# Patient Record
Sex: FEMALE | Race: WHITE
[De-identification: names, ages, dates, MRNs, and addresses within clinical notes are randomized per-mention and may not be internally consistent; named-entity substitution may affect disease eponyms.]

---

## 2018-06-22 ENCOUNTER — HOSPITAL ENCOUNTER (EMERGENCY)
Dept: HOSPITAL 25 - ED | Age: 68
Discharge: HOME | End: 2018-06-22
Payer: MEDICARE

## 2018-06-22 VITALS — SYSTOLIC BLOOD PRESSURE: 146 MMHG | DIASTOLIC BLOOD PRESSURE: 88 MMHG

## 2018-06-22 DIAGNOSIS — S00.31XA: ICD-10-CM
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
## 2018-06-22 DIAGNOSIS — S61.512A: Primary | ICD-10-CM

## 2018-06-22 DIAGNOSIS — Z23: ICD-10-CM

## 2018-06-22 PROCEDURE — 90471 IMMUNIZATION ADMIN: CPT

## 2018-06-22 PROCEDURE — 12002 RPR S/N/AX/GEN/TRNK2.6-7.5CM: CPT

## 2018-06-22 PROCEDURE — 99283 EMERGENCY DEPT VISIT LOW MDM: CPT

## 2018-06-22 PROCEDURE — 90715 TDAP VACCINE 7 YRS/> IM: CPT

## 2018-06-22 NOTE — ED
Laceration/Wound HPI





- HPI Summary


HPI Summary: 





Complains of mechanical fall today, hitting her nose on concrete, left wrist 

pain, laceration to left wrist.  Patient had nausea for around 20 minutes with 

mild nosebleed, but Denies LOC, HA, vision change, vomiting, change in mental 

status, trauma to tongue or lips teeth, neck pain, back pain, any other 

symptoms.  No anti-coag.





- History of Current Complaint


Stated Complaint: FALL/NAUSEA


Time Seen by Provider: 06/22/18 19:46


Hx Obtained From: Patient, Family/Caretaker


Onset Severity: Moderate


Current Severity: Mild


Pain Intensity: 4


Pain Scale Used: 0-10 Numeric


Associated Signs & Symptoms: Negative





- Allergy/Home Medications


Allergies/Adverse Reactions: 


 Allergies











Allergy/AdvReac Type Severity Reaction Status Date / Time


 


blue dye Allergy  Hives Verified 06/22/18 19:39











Home Medications: 


 Home Medications





ALPRAZolam TAB* [Xanax TAB*] 0.5 mg PO TID PRN 06/22/18 [History Confirmed 06/22 /18]


FLUoxetine CAP* [PROzac CAP*] 60 mg PO DAILY 06/22/18 [History Confirmed 06/22/ 18]











PMH/Surg Hx/FS Hx/Imm Hx


Endocrine/Hematology History: 


   Denies: Hx Anticoagulant Therapy


Cardiovascular History: 


   Denies: Hx Cardiac Arrest


Respiratory History: 


   Denies: Hx Lung Cancer


 History: 


   Denies: Hx Dialysis


Sensory History: 


   Denies: Hx Cataracts


EENT History: 


   Denies: Hx Deafness


Neurological History: 


   Denies: Hx CVA





- Cancer History


Hx Chemotherapy: No


Hx Radiation Therapy: No


Infectious Disease History: No


Infectious Disease History: 


   Denies: Traveled Outside the [place] in Last 30 Days





- Social History


Alcohol Use: Daily


Substance Use Type: Reports: None


Smoking Status (MU): Never Smoked Tobacco





Review of Systems


Constitutional: Negative


Eyes: Negative


Positive: Epistaxis, Other


Cardiovascular: Negative


Respiratory: Negative


Gastrointestinal: Negative


Genitourinary: Negative


Positive: Other


Skin: Negative


Neurological: Negative


Psychological: Normal


All Other Systems Reviewed And Are Negative: Yes





Physical Exam





- Summary


Physical Exam Summary: 





Small Abrasion to right side of nose.  No deformity, ecchymosis, erythema, 

swelling, to nose or periorbital tissues, face.  No evidence of trauma to lips 

tongue or teeth.  EOMs intact.  Full range of motion of jaw.  No tenderness to 

palpation with of head, face, neck.  Laceration to left distal forearm on volar 

surface.  Full range of motion of left wrist with flexion and extension.  Full 

range of motion of fingers of left hand.  PMS intact.  No ecchymosis, swelling, 

deformity, snuffbox tenderness, erythema to left wrist or hand.


Triage Information Reviewed: Yes


Vital Signs On Initial Exam: 


 Initial Vitals











Pulse BP Pulse Ox


 


 58   158/78   100 


 


 06/22/18 19:27  06/22/18 19:27  06/22/18 19:27











Vital Signs Reviewed: Yes


Appearance: Positive: Well-Appearing


Skin: Positive: Warm


Head/Face: Positive: Other


Eyes: Positive: Normal


ENT: Positive: Normal ENT inspection


Neck: Positive: Supple


Respiratory/Lung Sounds: Positive: Clear to Auscultation


Cardiovascular: Positive: Normal


Abdomen Description: Positive: Nontender


Musculoskeletal: Positive: Normal


Neurological: Positive: Normal


Psychiatric: Positive: Normal


AVPU Assessment: Alert





- Mclean Coma Scale


Best Eye Response: 4 - Spontaneous


Best Motor Response: 6 - Obeys Commands


Best Verbal Response: 5 - Oriented


Coma Scale Total: 15





Procedures





- Laceration/Wound Repair


  ** 1


Location: upper extremity


Description: Linear


Anesthesia: Local, 1.0%


Length, Depth and Shape: 4cm x .5cm


Betadine Prep?: Yes


Irrigated w/ Saline (ccs): 30


Laceration/Wound Explored: clean


Closure: SteriStrips


Debridement: minimal


Number of Sutures: 3 - 4.0 ethilon


Layer Closure?: No





Diagnostics





- Vital Signs


 Vital Signs











  Temp Pulse Resp BP Pulse Ox


 


 06/22/18 20:27   65   165/94  100


 


 06/22/18 20:00   67    99


 


 06/22/18 19:57   71   140/79  100


 


 06/22/18 19:30  98.7 F  64  16  158/78  100


 


 06/22/18 19:27   58   158/78  100














- Laboratory


Lab Statement: Any lab studies that have been ordered have been reviewed, and 

results considered in the medical decision making process.





Laceration Repair Course/Dx





- Course


Course Of Treatment: Complains of mechanical fall today, hitting her nose on 

concrete, left wrist pain, laceration to left wrist.  Patient had nausea for 

around 20 minutes with mild nosebleed, but Denies LOC, HA, vision change, 

vomiting, change in mental status, trauma to tongue or lips teeth, neck pain, 

back pain, any other symptoms.  No anti-coag.  Small Abrasion to right side of 

nose.  No deformity, ecchymosis, erythema, swelling, to nose or periorbital 

tissues, face.  No evidence of trauma to lips tongue or teeth.  EOMs intact.  

Full range of motion of jaw.  No tenderness to palpation with of head, face, 

neck.  Laceration to left distal forearm on volar surface.  Full range of 

motion of left wrist with flexion and extension.  Full range of motion of 

fingers of left hand.  PMS intact.  No ecchymosis, swelling, deformity, 

snuffbox tenderness, erythema to left wrist or hand.  Patient opted to defer CT 

head and left wrist x-ray, stating she will return for same if symptoms warrant.





- Clinical Impression


Provider Diagnoses: 


 Laceration, Fall








Discharge





- Sign-Out/Discharge


Documenting (check all that apply): Discharge/Admit/Transfer





- Discharge Plan


Condition: Stable


Disposition: HOME


Prescriptions: 


Cephalexin CAP* [Keflex CAP*] 500 mg PO TID 5 Days #15 cap


Patient Education Materials:  Care For Your Stitches (ED), Laceration (ED)


Referrals: 


Jamal Bourgeois MD [Primary Care Provider] - 


Additional Instructions: 


Sutures out in 10 days.  Wash with warm running water and soap.  Do not 

submerge underwater as an swimming.  Take antibiotics as directed.  Return to 

the ED for any new or worsening symptoms





- Billing Disposition and Condition


Condition: STABLE


Disposition: Home